# Patient Record
Sex: MALE | Race: WHITE | NOT HISPANIC OR LATINO | Employment: OTHER | URBAN - METROPOLITAN AREA
[De-identification: names, ages, dates, MRNs, and addresses within clinical notes are randomized per-mention and may not be internally consistent; named-entity substitution may affect disease eponyms.]

---

## 2017-01-30 ENCOUNTER — HOSPITAL ENCOUNTER (OUTPATIENT)
Dept: RADIOLOGY | Facility: HOSPITAL | Age: 67
Discharge: HOME | End: 2017-01-30
Attending: INTERNAL MEDICINE
Payer: MEDICARE

## 2017-01-30 ENCOUNTER — OFFICE VISIT (OUTPATIENT)
Dept: RHEUMATOLOGY | Facility: CLINIC | Age: 67
End: 2017-01-30
Payer: MEDICARE

## 2017-01-30 VITALS
WEIGHT: 153.5 LBS | HEART RATE: 78 BPM | HEIGHT: 68 IN | BODY MASS INDEX: 23.27 KG/M2 | SYSTOLIC BLOOD PRESSURE: 146 MMHG | DIASTOLIC BLOOD PRESSURE: 91 MMHG

## 2017-01-30 DIAGNOSIS — D86.9 SARCOID: Primary | ICD-10-CM

## 2017-01-30 DIAGNOSIS — R05.9 COUGH: ICD-10-CM

## 2017-01-30 DIAGNOSIS — D86.9 SARCOID: ICD-10-CM

## 2017-01-30 PROCEDURE — 99999 PR PBB SHADOW E&M-NEW PATIENT-LVL III: CPT | Mod: PBBFAC,,, | Performed by: INTERNAL MEDICINE

## 2017-01-30 PROCEDURE — 1125F AMNT PAIN NOTED PAIN PRSNT: CPT | Mod: S$GLB,,, | Performed by: INTERNAL MEDICINE

## 2017-01-30 PROCEDURE — 77077 JOINT SURVEY SINGLE VIEW: CPT | Mod: TC

## 2017-01-30 PROCEDURE — 1159F MED LIST DOCD IN RCRD: CPT | Mod: S$GLB,,, | Performed by: INTERNAL MEDICINE

## 2017-01-30 PROCEDURE — 99205 OFFICE O/P NEW HI 60 MIN: CPT | Mod: S$GLB,,, | Performed by: INTERNAL MEDICINE

## 2017-01-30 PROCEDURE — 1157F ADVNC CARE PLAN IN RCRD: CPT | Mod: S$GLB,,, | Performed by: INTERNAL MEDICINE

## 2017-01-30 PROCEDURE — 71020 XR CHEST PA AND LATERAL: CPT | Mod: 26,,, | Performed by: RADIOLOGY

## 2017-01-30 PROCEDURE — 1160F RVW MEDS BY RX/DR IN RCRD: CPT | Mod: S$GLB,,, | Performed by: INTERNAL MEDICINE

## 2017-01-30 PROCEDURE — 77077 JOINT SURVEY SINGLE VIEW: CPT | Mod: 26,,, | Performed by: RADIOLOGY

## 2017-01-30 PROCEDURE — 71020 XR CHEST PA AND LATERAL: CPT | Mod: TC

## 2017-01-30 RX ORDER — ATORVASTATIN CALCIUM 80 MG/1
TABLET, FILM COATED ORAL
COMMUNITY
Start: 2017-01-19

## 2017-01-30 RX ORDER — NAPROXEN 500 MG/1
500 TABLET ORAL 2 TIMES DAILY WITH MEALS
Qty: 60 TABLET | Refills: 5 | Status: SHIPPED | OUTPATIENT
Start: 2017-01-30 | End: 2017-03-13 | Stop reason: SDUPTHER

## 2017-01-30 RX ORDER — CYCLOBENZAPRINE HCL 5 MG
TABLET ORAL
COMMUNITY
Start: 2017-01-02 | End: 2017-03-14 | Stop reason: SDUPTHER

## 2017-01-30 RX ORDER — TAMSULOSIN HYDROCHLORIDE 0.4 MG/1
CAPSULE ORAL
COMMUNITY
Start: 2017-01-19

## 2017-01-30 RX ORDER — FOSINOPRIL SODIUM 20 MG/1
TABLET ORAL
COMMUNITY
Start: 2017-01-24

## 2017-01-30 RX ORDER — OMEPRAZOLE 20 MG/1
CAPSULE, DELAYED RELEASE ORAL
COMMUNITY
Start: 2017-01-19

## 2017-01-30 RX ORDER — METOPROLOL SUCCINATE 25 MG/1
TABLET, EXTENDED RELEASE ORAL
COMMUNITY
Start: 2017-01-19

## 2017-01-30 NOTE — MR AVS SNAPSHOT
[facility] - Rheumatology  [address]  [facility] [address]  Phone: [phone]  Fax: [phone]                  Mckinley Alicea   2017 11:30 AM   Office Visit    Description:  Male : 1950   Provider:  Ashley Weber MD   Department:  [facility] - Rheumatology           Diagnoses this Visit        Comments    Sarcoid    -  Primary     Cough                To Do List           Future Appointments        Provider Department Dept Phone    2017 12:10 PM LAB, SAME DAY [facility] [phone]    2017 12:25 PM LAB, SAME DAY [facility] [phone]    2017 12:45 PM NOM [id number] 485 LB LIMIT [facility] [phone]    2017 3:00 PM Ashley Weber MD [facility] [phone]      Goals (5 Years of Data)     None       These Medications        Disp Refills Start End    naproxen (EC NAPROSYN) 500 MG EC tablet 60 tablet 5 2017     Take 1 tablet (500 mg total) by mouth 2 (two) times daily with meals. - Oral    Pharmacy: [address] #: [phone]         [facility]     [facility] On Call Nurse Care Line -  Assistance  Registered nurses in the [facility] On Call Center provide clinical advisement, health education, appointment booking, and other advisory services.  Call for this free service at [phone].             Medications           Message regarding Medications     Verify the changes and/or additions to your medication regime listed below are the same as discussed with your clinician today.  If any of these changes or additions are incorrect, please notify your healthcare provider.        START taking these NEW medications        Refills    naproxen (EC NAPROSYN) 500 MG EC tablet 5    Sig: Take 1 tablet (500 mg total) by mouth 2 (two) times daily with meals.    Class: Normal    Route: Oral           Verify that the below  "list of medications is an accurate representation of the medications you are currently taking.  If none reported, the list may be blank. If incorrect, please contact your healthcare provider. Carry this list with you in case of emergency.           Current Medications     atorvastatin (LIPITOR) 80 MG tablet     cyclobenzaprine (FLEXERIL) 5 MG tablet     fosinopril (MONOPRIL) 20 MG tablet     metoprolol succinate (TOPROL-XL) 25 MG 24 hr tablet     naproxen (EC NAPROSYN) 500 MG EC tablet Take 1 tablet (500 mg total) by mouth 2 (two) times daily with meals.    omeprazole (PRILOSEC) 20 MG capsule     tamsulosin (FLOMAX) 0.4 mg Cp24            Clinical Reference Information           Vital Signs - Last Recorded  Most recent update: 1/30/2017 11:20 AM by Leia Rasmussen MA    BP Pulse Ht Wt BMI    (!) 146/91 78 5' 8" (1.727 m) 69.6 kg (153 lb 8 oz) 23.34 kg/m2      Blood Pressure          Most Recent Value    BP  (!)  146/91      Allergies as of 1/30/2017     Levaquin [Levofloxacin]    Sulfa (Sulfonamide Antibiotics)      Immunizations Administered on Date of Encounter - 1/30/2017     None      Orders Placed During Today's Visit     Future Labs/Procedures Expected by Expires    FRANKIE  1/30/2017 3/31/2018    Angiotensin converting enzyme  1/30/2017 3/31/2018    C-reactive protein  1/30/2017 3/31/2018    CBC auto differential  1/30/2017 3/31/2018    Comprehensive metabolic panel  1/30/2017 3/31/2018    Cyclic citrul peptide antibody, IgG  1/30/2017 3/31/2018    Hepatitis B core antibody, IgM  1/30/2017 3/31/2018    Hepatitis B surface antibody  1/30/2017 3/31/2018    Hepatitis B surface antigen  1/30/2017 3/31/2018    Hepatitis C antibody  1/30/2017 3/31/2018    Protein electrophoresis, serum  1/30/2017 3/31/2018    Quantiferon Gold TB  1/30/2017 3/31/2018    Rheumatoid factor  1/30/2017 3/31/2018    Sedimentation rate, manual  1/30/2017 3/31/2018    URIC ACID  1/30/2017 3/31/2018    X-Ray Chest PA And Lateral  1/30/2017 " 1/30/2018    XR Arthritis Survey  1/30/2017 1/30/2018      [facility] Sign-Up     Activating your [facility] account is as easy as 1-2-3!     1) Visit [email or web address], select Sign Up Now, enter this activation code and your date of birth, then select Next.  [email or web address]  Expires: 3/16/2017 11:57 AM      2) Create a username and password to use when you visit [facility] in the future and select a security question in case you lose your password and select Next.    3) Enter your e-mail address and click Sign Up!    Additional Information  If you have questions, please e-mail [email or web address] or call [phone] to talk to our [facility] staff. Remember, [facility] is NOT to be used for urgent needs. For medical emergencies, dial 911.

## 2017-01-30 NOTE — PROGRESS NOTES
Subjective:       Patient ID: Mckinley Alicea is a 66 y.o. male.    Chief Complaint: No chief complaint on file.    HPI    67 yo male with PMH of  HTN, HL, sarcoidosis, liver lesion, right shoulder dislocation in his 20s,  here for evaluation. He had biopsy done around 2004.  He was noted incidentally to have abnormal chest xray. He had chest lymph node biopsy that he reports he was told to have sarcoid.  He was not treated with steroids.  Reports he has pain in hands, lower back, left mtp.  He also has pain in lateral hips with sleeping.   Reports pain in lower back for about 15 years.For last year, the pain is more constant, throbbing.  He had one episode of sciatica on right side several months ago.  Moving hands makes pain worse.  Reports right hand swelling.   Reports hands are stiff, mainly in morning. He has trouble opening water bottle.      Review of Systems   Constitutional: Negative for appetite change, chills, fatigue and fever.   HENT: Negative for hearing loss, mouth sores, rhinorrhea, sinus pressure and trouble swallowing.    Eyes: Negative for photophobia, pain, discharge, itching and visual disturbance.   Respiratory: Negative for cough, chest tightness, wheezing and stridor.    Cardiovascular: Negative for chest pain and palpitations.   Gastrointestinal: Negative for abdominal distention and blood in stool.   Endocrine: Negative for cold intolerance and heat intolerance.   Genitourinary: Negative for dysuria, flank pain and hematuria.   Musculoskeletal: Positive for arthralgias, back pain, gait problem and joint swelling. Negative for myalgias, neck pain and neck stiffness.   Skin: Negative for color change, pallor and rash.   Neurological: Negative for dizziness, light-headedness, numbness and headaches.   Hematological: Negative for adenopathy. Does not bruise/bleed easily.   Psychiatric/Behavioral: Negative for agitation and decreased concentration. The patient is not nervous/anxious.       "    Objective:     Visit Vitals    BP (!) 146/91    Pulse 78    Ht 5' 8" (1.727 m)    Wt 69.6 kg (153 lb 8 oz)    BMI 23.34 kg/m2        Physical Exam   Constitutional: He is oriented to person, place, and time and well-developed, well-nourished, and in no distress. No distress.   HENT:   Head: Normocephalic and atraumatic.   Right Ear: External ear normal.   Left Ear: External ear normal.   Eyes: Conjunctivae and EOM are normal. Pupils are equal, round, and reactive to light. Right eye exhibits no discharge. Left eye exhibits no discharge. No scleral icterus.   Neck: Normal range of motion. Neck supple. No JVD present. No tracheal deviation present. No thyromegaly present.   Cardiovascular: Normal rate, regular rhythm and intact distal pulses.  Exam reveals no gallop and no friction rub.    No murmur heard.  Pulmonary/Chest: Effort normal and breath sounds normal. No stridor. No respiratory distress. He has no wheezes. He has no rales. He exhibits no tenderness.   Abdominal: Soft. Bowel sounds are normal. He exhibits no distension and no mass. There is no tenderness. There is no rebound and no guarding.   Lymphadenopathy:     He has no cervical adenopathy.   Neurological: He is alert and oriented to person, place, and time. He displays normal reflexes. No cranial nerve deficit. He exhibits normal muscle tone. Coordination normal.   Skin: Skin is warm and dry. No rash noted. He is not diaphoretic. No erythema. No pallor.     Psychiatric: Affect and judgment normal.   Musculoskeletal: Normal range of motion. He exhibits no edema, tenderness or deformity.   FROM of all joints including neck, shoulders, spine, ankles, wrists, knees, and ankles; no joint deformities noted or effusions, erythema or warmth; no tophi or nodules noted; no crepitus; no synovitis noted in hands or feet; no nail pitting or onycholysis              Outside xrays:  Right hand xray(7/2016): stable periarticular erosion along right fifth " "proximal phalanx, possibly gout     Lumbar xray:  (12/2016)" mild to moderate DJD    CT (7/2016):atherosclerosis; there is a lesion in liver,. No hilar or mediastinal LAD; there is some minor scarring in right middle lobe     Assessment:      65 yo male with PMH of  HTN, HL, sarcoidosis, liver lesion, right shoulder dislocation in his 20s,  here for evaluation.  Reports he had lymph node bx confirming sarcoid in 2004.  He presents today with diffuse arthralgias. I suspect most of his arthralgias are from DJD rather than inflammatory arthritis so will start NSAID.  He reports cough and history of sarcoid so will obtain chest xray.  No diagnosis found.        Plan:   **  labs  xrays  Start naproxen 500mg pO BID prn  rtc in 3 weeks  Over 50 percent of visit was used to review outside  xrays  "

## 2017-01-30 NOTE — LETTER
January 30, 2017      Mayo Thorpe MD  [address]  [address]  [place] [address]           [facility]  [address]  [place] [address]  Phone: [phone]  Fax: [phone]          Patient: Mckinley Alicea   MR Number: [id number]   YOB: 1950   Date of Visit: 1/30/2017       Dear Dr. Mayo Thorpe:    Thank you for referring Mckinley Alicea to me for evaluation. Attached you will find relevant portions of my assessment and plan of care.    If you have questions, please do not hesitate to call me. I look forward to following Mckinley Alicea along with you.    Sincerely,    Ashley Weber MD    Enclosure  CC:  No Recipients    If you would like to receive this communication electronically, please contact [email or web address] or [phone] to request more information on [employer] access.    For providers and/or their staff who would like to refer a patient to [facility], please contact us through our one-stop-shop provider referral line, [facility], at [phone].    If you feel you have received this communication in error or would no longer like to receive these types of communications, please e-mail [email or web address]

## 2017-03-13 ENCOUNTER — OFFICE VISIT (OUTPATIENT)
Dept: RHEUMATOLOGY | Facility: CLINIC | Age: 67
End: 2017-03-13
Payer: MEDICARE

## 2017-03-13 VITALS
BODY MASS INDEX: 23.63 KG/M2 | HEART RATE: 75 BPM | SYSTOLIC BLOOD PRESSURE: 143 MMHG | WEIGHT: 155.88 LBS | DIASTOLIC BLOOD PRESSURE: 82 MMHG | HEIGHT: 68 IN

## 2017-03-13 DIAGNOSIS — D86.9 SARCOID: ICD-10-CM

## 2017-03-13 DIAGNOSIS — M15.0 PRIMARY GENERALIZED (OSTEO)ARTHRITIS: Primary | ICD-10-CM

## 2017-03-13 PROCEDURE — 1159F MED LIST DOCD IN RCRD: CPT | Mod: S$GLB,,, | Performed by: INTERNAL MEDICINE

## 2017-03-13 PROCEDURE — 99214 OFFICE O/P EST MOD 30 MIN: CPT | Mod: S$GLB,,, | Performed by: INTERNAL MEDICINE

## 2017-03-13 PROCEDURE — 1160F RVW MEDS BY RX/DR IN RCRD: CPT | Mod: S$GLB,,, | Performed by: INTERNAL MEDICINE

## 2017-03-13 PROCEDURE — 1126F AMNT PAIN NOTED NONE PRSNT: CPT | Mod: S$GLB,,, | Performed by: INTERNAL MEDICINE

## 2017-03-13 PROCEDURE — 99999 PR PBB SHADOW E&M-EST. PATIENT-LVL III: CPT | Mod: PBBFAC,,, | Performed by: INTERNAL MEDICINE

## 2017-03-13 PROCEDURE — 1157F ADVNC CARE PLAN IN RCRD: CPT | Mod: S$GLB,,, | Performed by: INTERNAL MEDICINE

## 2017-03-13 RX ORDER — NAPROXEN 500 MG/1
500 TABLET ORAL 2 TIMES DAILY WITH MEALS
Qty: 60 TABLET | Refills: 5 | Status: SHIPPED | OUTPATIENT
Start: 2017-03-13

## 2017-03-13 RX ORDER — TRAMADOL HYDROCHLORIDE 50 MG/1
50 TABLET ORAL NIGHTLY
Qty: 90 TABLET | Refills: 5 | Status: SHIPPED | OUTPATIENT
Start: 2017-03-13 | End: 2017-06-11

## 2017-03-13 RX ORDER — VALACYCLOVIR HYDROCHLORIDE 1 G/1
TABLET, FILM COATED ORAL
COMMUNITY
Start: 2017-02-19

## 2017-03-13 ASSESSMENT — ROUTINE ASSESSMENT OF PATIENT INDEX DATA (RAPID3)
PATIENT GLOBAL ASSESSMENT SCORE: 1.5
PSYCHOLOGICAL DISTRESS SCORE: 1.1
FATIGUE SCORE: .5
PAIN SCORE: 2.5
TOTAL RAPID3 SCORE: 1.56
AM STIFFNESS SCORE: 0, NO
MDHAQ FUNCTION SCORE: .2

## 2017-03-13 NOTE — MR AVS SNAPSHOT
[facility] - Rheumatology  [address]  [facility] [address]  Phone: [phone]  Fax: [phone]                  Mckinley Alicea   3/13/2017 2:00 PM   Office Visit    Description:  Male : 1950   Provider:  Ashley Weber MD   Department:  [facility] - Rhe[facility]           Reason for Visit     Follow-up                To Do List           Future Appointments        Provider Department Dept Phone    2017 11:00 AM Ashley Weber MD [facility] - Rheumatology [phone]      Goals (5 Years of Data)     None       These Medications        Disp Refills Start End    tramadol (ULTRAM) 50 mg tablet 90 tablet 5 3/13/2017 2017    Take 1 tablet (50 mg total) by mouth every evening. - Oral    Pharmacy: [address] Ph #: [phone]       naproxen (EC NAPROSYN) 500 MG EC tablet 60 tablet 5 3/13/2017     Take 1 tablet (500 mg total) by mouth 2 (two) times daily with meals. - Oral    Pharmacy: [address] Ph #: [phone]         [facility] On Call     [facility] On Call Nurse Care Line -  Assistance  Registered nurses in the [facility] Center provide clinical advisement, health education, appointment booking, and other advisory services.  Call for this free service at [phone].             Medications           Message regarding Medications     Verify the changes and/or additions to your medication regime listed below are the same as discussed with your clinician today.  If any of these changes or additions are incorrect, please notify your healthcare provider.        START taking these NEW medications        Refills    tramadol (ULTRAM) 50 mg tablet 5    Sig: Take 1 tablet (50 mg total) by mouth every evening.    Class: Print    Route: Oral           Verify that the below list of medications is an accurate representation of the medications you are currently  "taking.  If none reported, the list may be blank. If incorrect, please contact your healthcare provider. Carry this list with you in case of emergency.           Current Medications     atorvastatin (LIPITOR) 80 MG tablet     cyclobenzaprine (FLEXERIL) 5 MG tablet     fosinopril (MONOPRIL) 20 MG tablet     metoprolol succinate (TOPROL-XL) 25 MG 24 hr tablet     naproxen (EC NAPROSYN) 500 MG EC tablet Take 1 tablet (500 mg total) by mouth 2 (two) times daily with meals.    omeprazole (PRILOSEC) 20 MG capsule     tamsulosin (FLOMAX) 0.4 mg Cp24     valacyclovir (VALTREX) 1000 MG tablet     tramadol (ULTRAM) 50 mg tablet Take 1 tablet (50 mg total) by mouth every evening.           Clinical Reference Information           Your Vitals Were     BP Pulse Height Weight BMI    143/82 (BP Location: Left arm, Patient Position: Sitting, BP Method: Automatic) 75 5' 8" (1.727 m) 70.7 kg (155 lb 14.4 oz) 23.7 kg/m2      Blood Pressure          Most Recent Value    BP  (!)  143/82      Allergies as of 3/13/2017     Mobic [Meloxicam]    Levaquin [Levofloxacin]    Sulfa (Sulfonamide Antibiotics)      Immunizations Administered on Date of Encounter - 3/13/2017     None      Language Assistance Services     ATTENTION: Language assistance services are available, free of charge. Please call [phone].      ATENCIÓN: Si habla boaz, tiene a dos santos disposición servicios gratuitos de asistencia lingüística. Llame al [phone].     SAPPHIRE Ý: N?u b?n nói Ti?ng Vi?t, có các d?ch v? h? tr? ngôn ng? mi?n phí dành cho b?n. G?i s? [phone].         [facility] complies with applicable Federal civil rights laws and does not discriminate on the basis of race, color, national origin, age, disability, or sex.        "

## 2017-03-13 NOTE — PROGRESS NOTES
Subjective:       Patient ID: Mckinley Alicea is a 66 y.o. male.    Chief Complaint: No chief complaint on file.    HPI    65 yo male with PMH of  HTN, HL, sarcoidosis, liver lesion, right shoulder dislocation in his 20s,  here for evaluation. He had biopsy done around 2004.  He was noted incidentally to have abnormal chest xray. He had chest lymph node biopsy that he reports he was told to have sarcoid.  He was not treated with steroids.  Reports he has pain in hands, lower back, left mtp.  He also has pain in lateral hips with sleeping.   Reports pain in lower back for about 15 years.For last year, the pain is more constant, throbbing.  He had one episode of sciatica on right side several months ago.  Moving hands makes pain worse.  Reports right hand swelling.   Reports hands are stiff, mainly in morning. He has trouble opening water bottle.      Interval history: THERE HAS BEEN IMPROVEMENT IN PAIN  IN SHOULDERS AND HANDS.  He takes naproxen  Twice daily. Pain is worse at night.  He reports having pain in both sides. He reports that it has not improved. He reports that flexeril helps him with sleep.  He reports stiffness in hands in morning for a few minutes. Right hand will get swollen on occasion.  Review of Systems   Constitutional: Negative for appetite change, chills, fatigue and fever.   HENT: Negative for hearing loss, mouth sores, rhinorrhea, sinus pressure and trouble swallowing.    Eyes: Negative for photophobia, pain, discharge, itching and visual disturbance.   Respiratory: Negative for cough, chest tightness, wheezing and stridor.    Cardiovascular: Negative for chest pain and palpitations.   Gastrointestinal: Negative for abdominal distention and blood in stool.   Endocrine: Negative for cold intolerance and heat intolerance.   Genitourinary: Negative for dysuria, flank pain and hematuria.   Musculoskeletal: Positive for arthralgias, back pain, gait problem and joint swelling. Negative for myalgias,  "neck pain and neck stiffness.   Skin: Negative for color change, pallor and rash.   Neurological: Negative for dizziness, light-headedness, numbness and headaches.   Hematological: Negative for adenopathy. Does not bruise/bleed easily.   Psychiatric/Behavioral: Negative for agitation and decreased concentration. The patient is not nervous/anxious.          Objective:     Visit Vitals    BP (!) 146/91    Pulse 78    Ht 5' 8" (1.727 m)    Wt 69.6 kg (153 lb 8 oz)    BMI 23.34 kg/m2        Physical Exam   Constitutional: He is oriented to person, place, and time and well-developed, well-nourished, and in no distress. No distress.   HENT:   Head: Normocephalic and atraumatic.   Right Ear: External ear normal.   Left Ear: External ear normal.   Eyes: Conjunctivae and EOM are normal. Pupils are equal, round, and reactive to light. Right eye exhibits no discharge. Left eye exhibits no discharge. No scleral icterus.   Neck: Normal range of motion. Neck supple. No JVD present. No tracheal deviation present. No thyromegaly present.   Cardiovascular: Normal rate, regular rhythm and intact distal pulses.  Exam reveals no gallop and no friction rub.    No murmur heard.  Pulmonary/Chest: Effort normal and breath sounds normal. No stridor. No respiratory distress. He has no wheezes. He has no rales. He exhibits no tenderness.   Abdominal: Soft. Bowel sounds are normal. He exhibits no distension and no mass. There is no tenderness. There is no rebound and no guarding.   Lymphadenopathy:     He has no cervical adenopathy.   Neurological: He is alert and oriented to person, place, and time. He displays normal reflexes. No cranial nerve deficit. He exhibits normal muscle tone. Coordination normal.   Skin: Skin is warm and dry. No rash noted. He is not diaphoretic. No erythema. No pallor.     Psychiatric: Affect and judgment normal.   Musculoskeletal: Normal range of motion. He exhibits no edema, tenderness or deformity.   FROM of " "all joints including neck, shoulders, spine, ankles, wrists, knees, and ankles; no joint deformities noted or effusions, erythema or warmth; no tophi or nodules noted; no crepitus; no synovitis noted in hands or feet; no nail pitting or onycholysis          Labs reviewed"  Ccp,rf-negative  Brigid-negative    Outside xrays:  Right hand xray(7/2016): stable periarticular erosion along right fifth proximal phalanx, possibly gout     Lumbar xray:  (12/2016)" mild to moderate DJD    CT (7/2016):atherosclerosis; there is a lesion in liver,. No hilar or mediastinal LAD; there is some minor scarring in right middle lobe     Assessment:      65 yo male with PMH of  HTN, HL, sarcoidosis, liver lesion, right shoulder dislocation in his 20s,  COPD  here for evaluation.  Reports he had lymph node bx confirming sarcoid in 2004.  He presents for follow up of  diffuse arthralgias. Based on blood work and xrays, his pain is from DJD rather than an inflammatory arthritis.      Plan:     Start naproxen 500mg po in morning and start tramadol 50 mg po qhs  Start aspercreme to feet BID PRN  rtc in 4 months  "

## 2017-03-14 ENCOUNTER — PATIENT MESSAGE (OUTPATIENT)
Dept: RHEUMATOLOGY | Facility: CLINIC | Age: 67
End: 2017-03-14

## 2017-03-14 RX ORDER — CYCLOBENZAPRINE HCL 5 MG
5 TABLET ORAL NIGHTLY
Qty: 30 TABLET | Refills: 3 | Status: SHIPPED | OUTPATIENT
Start: 2017-03-14 | End: 2017-07-18 | Stop reason: SDUPTHER

## 2017-03-14 NOTE — TELEPHONE ENCOUNTER
"Email sent through portal by pt. "Hi DR. Weber   I went to  my prescription for cyclobenzapr 5mg tab leg, 1 tablet at bedtime at the [facility] that is located on   [facility] in [place] and they told me they did not have any records of this prescription being called in.   Can you please check to see what happened,i would like to pick it up by friday.  Thank you           beltran hogan ".  "

## 2017-07-14 ENCOUNTER — TELEPHONE (OUTPATIENT)
Dept: RHEUMATOLOGY | Facility: CLINIC | Age: 67
End: 2017-07-14

## 2017-07-17 ENCOUNTER — OFFICE VISIT (OUTPATIENT)
Dept: RHEUMATOLOGY | Facility: CLINIC | Age: 67
End: 2017-07-17
Payer: MEDICARE

## 2017-07-17 VITALS
DIASTOLIC BLOOD PRESSURE: 87 MMHG | HEIGHT: 68 IN | SYSTOLIC BLOOD PRESSURE: 148 MMHG | BODY MASS INDEX: 22.9 KG/M2 | RESPIRATION RATE: 18 BRPM | WEIGHT: 151.13 LBS | HEART RATE: 68 BPM

## 2017-07-17 DIAGNOSIS — D86.9 SARCOIDOSIS: Primary | ICD-10-CM

## 2017-07-17 DIAGNOSIS — M15.9 PRIMARY OSTEOARTHRITIS INVOLVING MULTIPLE JOINTS: ICD-10-CM

## 2017-07-17 PROCEDURE — 99214 OFFICE O/P EST MOD 30 MIN: CPT | Mod: S$GLB,,, | Performed by: INTERNAL MEDICINE

## 2017-07-17 PROCEDURE — 99999 PR PBB SHADOW E&M-EST. PATIENT-LVL III: CPT | Mod: PBBFAC,,, | Performed by: INTERNAL MEDICINE

## 2017-07-17 PROCEDURE — 1159F MED LIST DOCD IN RCRD: CPT | Mod: S$GLB,,, | Performed by: INTERNAL MEDICINE

## 2017-07-17 PROCEDURE — 1126F AMNT PAIN NOTED NONE PRSNT: CPT | Mod: S$GLB,,, | Performed by: INTERNAL MEDICINE

## 2017-07-17 RX ORDER — AMLODIPINE BESYLATE 5 MG/1
TABLET ORAL
COMMUNITY
Start: 2017-06-19

## 2017-07-17 RX ORDER — DICLOFENAC SODIUM 10 MG/G
2 GEL TOPICAL 4 TIMES DAILY
Qty: 1 TUBE | Refills: 5 | Status: SHIPPED | OUTPATIENT
Start: 2017-07-17

## 2017-07-17 ASSESSMENT — ROUTINE ASSESSMENT OF PATIENT INDEX DATA (RAPID3)
PAIN SCORE: 1
TOTAL RAPID3 SCORE: .78
WHEN YOU AWAKENED IN THE MORNING OVER THE LAST WEEK, PLEASE INDICATE THE AMOUNT OF TIME IT TAKES UNTIL YOU ARE AS LIMBER AS YOU WILL BE FOR THE DAY: 2 MINS
PATIENT GLOBAL ASSESSMENT SCORE: 1
PSYCHOLOGICAL DISTRESS SCORE: 1.1
MDHAQ FUNCTION SCORE: .1
AM STIFFNESS SCORE: 1, YES
FATIGUE SCORE: 1

## 2017-07-17 NOTE — PROGRESS NOTES
Subjective:       Patient ID: Mckinley Alicea is a 66 y.o. male.    Chief Complaint: No chief complaint on file.    HPI    65 yo male with PMH of  HTN, HL, sarcoidosis, liver lesion, right shoulder dislocation in his 20s,  here for evaluation. He had biopsy done around 2004.  He was noted incidentally to have abnormal chest xray. He had chest lymph node biopsy that he reports he was told to have sarcoid.  He was not treated with steroids.  Reports he has pain in hands, lower back, left mtp.  He also has pain in lateral hips with sleeping.   Reports pain in lower back for about 15 years.For last year, the pain is more constant, throbbing.  He had one episode of sciatica on right side several months ago.  Moving hands makes pain worse.  Reports right hand swelling.   Reports hands are stiff, mainly in morning. He has trouble opening water bottle.      Interval history:    THERE HAS BEEN IMPROVEMENT IN PAIN  IN SHOULDERS AND HANDS.  He takes naproxen  Twice daily. Pain is worse at night.  He reports having pain in both sides. He reports that it has not improved. He reports that flexeril helps him with sleep.  He reports stiffness in hands in morning for a few minutes. Right hand will get swollen on occasion.        Review of Systems   Constitutional: Negative for appetite change, chills, fatigue and fever.   HENT: Negative for hearing loss, mouth sores, rhinorrhea, sinus pressure and trouble swallowing.    Eyes: Negative for photophobia, pain, discharge, itching and visual disturbance.   Respiratory: Negative for cough, chest tightness, wheezing and stridor.    Cardiovascular: Negative for chest pain and palpitations.   Gastrointestinal: Negative for abdominal distention and blood in stool.   Endocrine: Negative for cold intolerance and heat intolerance.   Genitourinary: Negative for dysuria, flank pain and hematuria.   Musculoskeletal: Positive for arthralgias, back pain, gait problem and joint swelling. Negative for  "myalgias, neck pain and neck stiffness.   Skin: Negative for color change, pallor and rash.   Neurological: Negative for dizziness, light-headedness, numbness and headaches.   Hematological: Negative for adenopathy. Does not bruise/bleed easily.   Psychiatric/Behavioral: Negative for agitation and decreased concentration. The patient is not nervous/anxious.          Objective:     Visit Vitals    BP (!) 146/91    Pulse 78    Ht 5' 8" (1.727 m)    Wt 69.6 kg (153 lb 8 oz)    BMI 23.34 kg/m2        Physical Exam   Constitutional: He is oriented to person, place, and time and well-developed, well-nourished, and in no distress. No distress.   HENT:   Head: Normocephalic and atraumatic.   Right Ear: External ear normal.   Left Ear: External ear normal.   Eyes: Conjunctivae and EOM are normal. Pupils are equal, round, and reactive to light. Right eye exhibits no discharge. Left eye exhibits no discharge. No scleral icterus.   Neck: Normal range of motion. Neck supple. No JVD present. No tracheal deviation present. No thyromegaly present.   Cardiovascular: Normal rate, regular rhythm and intact distal pulses.  Exam reveals no gallop and no friction rub.    No murmur heard.  Pulmonary/Chest: Effort normal and breath sounds normal. No stridor. No respiratory distress. He has no wheezes. He has no rales. He exhibits no tenderness.   Abdominal: Soft. Bowel sounds are normal. He exhibits no distension and no mass. There is no tenderness. There is no rebound and no guarding.   Lymphadenopathy:     He has no cervical adenopathy.   Neurological: He is alert and oriented to person, place, and time. He displays normal reflexes. No cranial nerve deficit. He exhibits normal muscle tone. Coordination normal.   Skin: Skin is warm and dry. No rash noted. He is not diaphoretic. No erythema. No pallor.     Psychiatric: Affect and judgment normal.   Musculoskeletal: Normal range of motion. He exhibits no edema, tenderness or deformity. " "  FROM of all joints including neck, shoulders, spine, ankles, wrists, knees, and ankles; no joint deformities noted or effusions, erythema or warmth; no tophi or nodules noted; no crepitus; no synovitis noted in hands or feet; no nail pitting or onycholysis          Labs reviewed"  Ccp,rf-negative  Brigid-negative    Outside xrays:  Right hand xray(7/2016): stable periarticular erosion along right fifth proximal phalanx, possibly gout     Lumbar xray:  (12/2016)" mild to moderate DJD    CT (7/2016):atherosclerosis; there is a lesion in liver,. No hilar or mediastinal LAD; there is some minor scarring in right middle lobe     Assessment:      65 yo male with PMH of  HTN, HL, sarcoidosis, liver lesion, right shoulder dislocation in his 20s,  COPD  here for evaluation.  Reports he had lymph node bx confirming sarcoid in 2004.  He presents for follow up of  diffuse arthralgias. Based on blood work and xrays, his pain is from DJD rather than an inflammatory arthritis.    Plan:   Continue naproxen 500mg po in morning and start tramadol 50 mg po qhs  Continue  aspercreme to feet BID PRN  Start tramadol 50mg po qhs  Start diclofenac gel on days where he does not use naproxen  rtc in 5 months  "

## 2017-07-19 RX ORDER — CYCLOBENZAPRINE HCL 5 MG
TABLET ORAL
Qty: 30 TABLET | Refills: 11 | Status: SHIPPED | OUTPATIENT
Start: 2017-07-19

## 2018-09-10 ENCOUNTER — CLINICAL SUPPORT (OUTPATIENT)
Dept: AUDIOLOGY | Facility: CLINIC | Age: 68
End: 2018-09-10
Payer: MEDICARE

## 2018-09-10 ENCOUNTER — OFFICE VISIT (OUTPATIENT)
Dept: OTOLARYNGOLOGY | Facility: CLINIC | Age: 68
End: 2018-09-10
Payer: MEDICARE

## 2018-09-10 VITALS
WEIGHT: 148 LBS | BODY MASS INDEX: 23.23 KG/M2 | HEIGHT: 67 IN | DIASTOLIC BLOOD PRESSURE: 80 MMHG | SYSTOLIC BLOOD PRESSURE: 110 MMHG

## 2018-09-10 DIAGNOSIS — H93.13 TINNITUS AURIUM, BILATERAL: ICD-10-CM

## 2018-09-10 DIAGNOSIS — H90.3 SENSORINEURAL HEARING LOSS (SNHL) OF BOTH EARS: ICD-10-CM

## 2018-09-10 DIAGNOSIS — H90.A32 MIXED CONDUCTIVE AND SENSORINEURAL HEARING LOSS OF LEFT EAR WITH RESTRICTED HEARING OF RIGHT EAR: Primary | ICD-10-CM

## 2018-09-10 PROBLEM — J30.9 ALLERGIC RHINITIS: Status: ACTIVE | Noted: 2018-09-10

## 2018-09-10 PROCEDURE — 99499 UNLISTED E&M SERVICE: CPT | Mod: S$GLB,,, | Performed by: AUDIOLOGIST

## 2018-09-10 PROCEDURE — 92550 TYMPANOMETRY & REFLEX THRESH: CPT | Mod: S$GLB,,, | Performed by: AUDIOLOGIST

## 2018-09-10 PROCEDURE — 99203 OFFICE O/P NEW LOW 30 MIN: CPT | Mod: S$GLB,,, | Performed by: OTOLARYNGOLOGY

## 2018-09-10 PROCEDURE — 92557 COMPREHENSIVE HEARING TEST: CPT | Mod: S$GLB,,, | Performed by: AUDIOLOGIST

## 2018-09-10 PROCEDURE — 1101F PT FALLS ASSESS-DOCD LE1/YR: CPT | Mod: CPTII,S$GLB,, | Performed by: OTOLARYNGOLOGY

## 2018-09-10 RX ORDER — MELOXICAM 15 MG/1
15 TABLET ORAL DAILY
COMMUNITY

## 2018-09-10 NOTE — PROGRESS NOTES
Pt seen for hearing test.  Results indicate normal sloping to moderate SNHL in the right ear and normal sloping to mixed HL in the left ear.

## 2018-09-10 NOTE — PROGRESS NOTES
History of Present Illness:  Mckinley Alicea presents to the clinic today for Hearing Loss  .  He complains of hearing loss with speech discrimination difficulties. He complains of errors and understanding conversation and has to face the person talking to him to understand them well. 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  He does have tinnitus.  He has not had any vertigo.    His other complaint is that he has chronic allergic rhinitis and uses Nasonex without much success.  We discussed the possible sprays used to treat allergic rhinitis and will try him on Astelin.      Review of Systems   Ears: Positive for hearing loss and ringing in ear.  Negative for ear pain, ear pressure, ear discharge, ear infections and dizziness.    Nose:  Positive for nasal obstruction and postnasal drip. Negative for nasal or sinus surgery and loss of smell.        Physical Exam:    Constitutional:   Vital signs are normal. He appears well-developed and well-nourished. He is active.     Head:  Normocephalic. Salivary glands normal.  Facial strength is normal.      Ears:  Hearing normal to normal and whispered voice; external ear normal without scars, lesions, or masses; ear canal, tympanic membrane, and middle ear normal..     Nose:  Mucosal edema and rhinorrhea present. Turbinates abnormal and turbinate hypertrophy.  No turbinate masses.      Mouth/Throat  Lips, teeth, and gums normal and oropharynx normal.     Neck:  Neck normal without thyromegaly masses, asymmetry, normal tracheal structure, crepitus, and tenderness.          Assessment:   Mckinley was seen today for hearing loss.    Diagnoses and all orders for this visit:    Sensorineural hearing loss (SNHL) of both ears  -     Ambulatory referral to Audiology  -     COMPREHENSIVE AUDIOGRAM; Future    Tinnitus aurium, bilateral  -     Ambulatory referral to Audiology  -      COMPREHENSIVE AUDIOGRAM was done in today's visit.  He has a bilateral sensory neural high frequency loss with normal hearing through 3 K.  His speech discrim is 92% in the right ear and 80% in the left ear. His speech reception was 15 dB in the right ear and 5 dB in the left ear. He has a small asymmetry in his high-frequency hearing loss with the left ear being slightly worse than the right.          Plan:   consult to audiology for a comprehensive audiogram and hearing aid evaluation was done with the results listed above.    We will try him on Astelin spray since he has little success with the steroid spray he is currently using.  He needs to return annually with an audiogram.  We stressed hearing protection at work and with yd work or any loud noise.    Follow-up in about 1 year (around 9/10/2019) for Ear cleaning and augiogram.

## 2020-03-09 ENCOUNTER — TELEPHONE (OUTPATIENT)
Dept: DERMATOLOGY | Facility: CLINIC | Age: 70
End: 2020-03-09

## 2020-03-09 NOTE — TELEPHONE ENCOUNTER
----- Message from Bree Rasmussen sent at 3/9/2020  4:10 PM CDT -----  Contact: patient  Please call above patient at [phone] need to schedule appointment before may waiting on a call back thanks.